# Patient Record
Sex: FEMALE | Race: OTHER | Employment: OTHER | URBAN - METROPOLITAN AREA
[De-identification: names, ages, dates, MRNs, and addresses within clinical notes are randomized per-mention and may not be internally consistent; named-entity substitution may affect disease eponyms.]

---

## 2017-04-06 NOTE — PATIENT DISCUSSION
(X17.060) Keratoconjunct sicca, not specified as Sjogren's, bilateral - Assesment : Examination revealed Dry Eye Syndrome OU. - Plan : Monitor for changes. Advised patient to call our office with decreased vision or increased symptoms. Mild symptoms, given sample of TheraTears and advised to use OU 2-3x/daily and PRN for comfort.

## 2017-04-06 NOTE — PATIENT DISCUSSION
(H25.13) Age-related nuclear cataract, bilateral - Assesment : Examination revealed cataract OU. - Plan : Monitor for changes. Advised patient to call our office with decreased vision or increased symptoms.

## 2017-04-06 NOTE — PATIENT DISCUSSION
(H40.011) Open angle with borderline findings, low risk, right eye - Assesment : Examination revealed suspicion for Open Angle Glaucoma. Very low risk. IOP stable OU. OCT ONH stable OU. - Plan : Monitor for changes. Patient to call in with increased problems or decreased vision. RV for 1 Year for Exam (OCT [place] Silva 74 as needed if increased IOP).

## 2018-05-29 NOTE — PATIENT DISCUSSION
(I15.020) Keratoconjunct sicca, not specified as Sjogren's, bilateral - Assesment : Examination revealed Dry Eye Syndrome OU. - Plan : Monitor for changes. Recommended ATs 2-4 times per day and prn.

## 2018-05-29 NOTE — PATIENT DISCUSSION
(H40.011) Open angle with borderline findings, low risk, right eye - Assesment : Examination revealed suspicion for Open Angle Glaucoma. Very low risk. OCT ONH today to monitor nerves as Pt deferred dilation; wnl and stable today. - Plan : Monitor for nerve changes with dilated eye exams. OCT ONH prn if IOP elevated, upon Dr's request, or if defers dilation. RTC in 1 year for Exam, sooner if problems or changes.

## 2018-09-26 ENCOUNTER — NEW PATIENT COMPREHENSIVE (OUTPATIENT)
Dept: URBAN - METROPOLITAN AREA CLINIC 35 | Facility: CLINIC | Age: 28
End: 2018-09-26

## 2018-09-26 VITALS — DIASTOLIC BLOOD PRESSURE: 78 MMHG | SYSTOLIC BLOOD PRESSURE: 110 MMHG | HEART RATE: 87 BPM | HEIGHT: 55 IN

## 2018-09-26 DIAGNOSIS — H43.813: ICD-10-CM

## 2018-09-26 DIAGNOSIS — H52.7: ICD-10-CM

## 2018-09-26 PROCEDURE — 92015 DETERMINE REFRACTIVE STATE: CPT

## 2018-09-26 PROCEDURE — 99204 OFFICE O/P NEW MOD 45 MIN: CPT

## 2018-09-26 PROCEDURE — 92134 CPTRZ OPH DX IMG PST SGM RTA: CPT

## 2018-09-26 ASSESSMENT — VISUAL ACUITY
OS_SC: 20/25-2
OD_SC: J1
OS_SC: J1
OD_SC: 20/25-1

## 2018-09-26 ASSESSMENT — TONOMETRY
OD_IOP_MMHG: 12
OS_IOP_MMHG: 14